# Patient Record
Sex: MALE | Race: WHITE | Employment: OTHER | URBAN - METROPOLITAN AREA
[De-identification: names, ages, dates, MRNs, and addresses within clinical notes are randomized per-mention and may not be internally consistent; named-entity substitution may affect disease eponyms.]

---

## 2017-07-25 ENCOUNTER — HOSPITAL ENCOUNTER (OUTPATIENT)
Dept: VASCULAR SURGERY | Age: 72
Discharge: HOME | End: 2017-07-25
Attending: FAMILY MEDICINE
Payer: MEDICARE

## 2017-07-25 DIAGNOSIS — I65.29: ICD-10-CM

## 2017-07-25 PROCEDURE — 93880 EXTRACRANIAL BILAT STUDY: CPT

## 2017-07-25 NOTE — PROCEDURES
[facility]  *** FINAL REPORT ***    Name: Barbi Kovacs  MRN: [id number]    Outpatient  : 13 Aug 1945  HIS Order #: [id number]  [id number] [facility] Visit #: [id number]  Date: 2017    TYPE OF TEST: Cerebrovascular Duplex    REASON FOR TEST  Known carotid stenosis    Right Carotid:-             Proximal               Mid                 Distal  cm/s  Systolic  Diastolic  Systolic  Diastolic  Systolic  Diastolic  CCA:     28.3      25.0                            56.0      19.0  Bulb:  ECA:     64.0  ICA:    108.0      34.0                            85.0      37.0  ICA/CCA:  1.9       1.8    ICA Stenosis:    Right Vertebral:-  Finding: Antegrade  Sys:       40.0  Rubia:    Right Subclavian:    Left Carotid:-            Proximal                Mid                 Distal  cm/s  Systolic  Diastolic  Systolic  Diastolic  Systolic  Diastolic  CCA:     11.1      13.0                            54.0      14.0  Bulb:  ECA:     87.0  ICA:    306.0     112.0                            40.0      20.0  ICA/CCA:  5.7       8.0    ICA Stenosis:    Left Vertebral:-  Finding: Antegrade  Sys:       37.0  Rubia:    Left Subclavian:    INTERPRETATION/FINDINGS  PROCEDURE:  Color duplex ultrasound imaging of extracranial  cerebrovascular arteries. FINDINGS:       Right:  Internal carotid velocity is within normal limits. There   is narrowing of the internal carotid flow channel on color Doppler  imaging and calcific-mix plaque on B-mode imaging, consistent with  less than 50 percent stenosis. The common and external carotid  arteries are patent and without evidence of hemodynamically  significant stenosis. Left:  Internal carotid velocity is elevated to a level  consistent with a 70 percent or greater stenosis; there is narrowing  of the flow channel on color Doppler imaging and calcific-mix plaque  is visualized on B-mode imaging.   The common and external carotid  arteries are patent and without evidence of hemodynamically  significant stenosis. IMPRESSION:  Consistent with less than 50% stenosis (higher end) of  the right internal carotid and greater than 70% stenosis of the left  internal carotid. Vertebrals are patent with antegrade flow. ADDITIONAL COMMENTS    I have personally reviewed the data relevant to the interpretation of  this  study.     TECHNOLOGIST: Mya Talley RVT  Signed: 07/25/2017 11:27 AM    PHYSICIAN: Yvette Hawkins MD  Signed: 07/26/2017 07:36 AM

## 2018-08-17 ENCOUNTER — HOSPITAL ENCOUNTER (OUTPATIENT)
Dept: VASCULAR SURGERY | Age: 73
Discharge: HOME | End: 2018-08-17
Attending: FAMILY MEDICINE
Payer: MEDICARE

## 2018-08-17 DIAGNOSIS — I65.29 CAROTID ARTERY STENOSIS WITHOUT CEREBRAL INFARCTION, UNSPECIFIED LATERALITY: ICD-10-CM

## 2018-08-17 PROCEDURE — 93880 EXTRACRANIAL BILAT STUDY: CPT

## 2018-08-17 NOTE — PROCEDURES
[facility]  *** FINAL REPORT ***    Name: Sandra Moss  MRN: [id number]    Outpatient  : 13 Aug 1945  HIS Order #: [id number]  [id number] [facility] Visit #: [id number]  Date: 17 Aug 2018    TYPE OF TEST: Cerebrovascular Duplex    REASON FOR TEST  Known carotid stenosis    Right Carotid:-             Proximal               Mid                 Distal  cm/s  Systolic  Diastolic  Systolic  Diastolic  Systolic  Diastolic  CCA:     01.1      22.0                            91.0      24.0  Bulb:  ECA:     97.0  ICA:    109.0      41.0       69.0      20.0       62.0      23.0  ICA/CCA:  1.2       1.7    ICA Stenosis:    Right Vertebral:-  Finding: Antegrade  Sys:       41.0  Rubia:    Right Subclavian:    Left Carotid:-            Proximal                Mid                 Distal  cm/s  Systolic  Diastolic  Systolic  Diastolic  Systolic  Diastolic  CCA:     44.1      16.0                            70.0      17.0  Bulb:  ECA:    118.0  ICA:    254.0      92.0      183.0      47.0       60.0      23.0  ICA/CCA:  3.6       5.4    ICA Stenosis:    Left Vertebral:-  Finding: Antegrade  Sys:       55.0  Rubia:    Left Subclavian:    INTERPRETATION/FINDINGS  PROCEDURE:  Color duplex ultrasound imaging of extracranial  cerebrovascular arteries. FINDINGS:       Right:  Internal carotid velocity is within normal limits. There   is narrowing of the internal carotid flow channel on color Doppler  imaging and calcific-mix plaque on B-mode imaging, consistent with  less than 50 percent stenosis. The common and external carotid  arteries are patent and without evidence of hemodynamically  significant stenosis. Left:  Internal carotid velocity is elevated to a level  consistent with a 70 percent or greater stenosis; there is narrowing  of the flow channel on color Doppler imaging and calcific plaque is  visualized on B-mode imaging.   The common and external carotid  arteries are patent and without evidence of hemodynamically  significant stenosis. IMPRESSION:  Consistent with less than 50% stenosis (higher end) of  the right internal carotid and greater than 70% stenosis (low end) of  the left internal carotid. Vertebrals are patent with antegrade flow. In comparison to the previous study done on 07/25/2017, there is no  evidence of a significant progression of stenosis on today's exam.    ADDITIONAL COMMENTS    I have personally reviewed the data relevant to the interpretation of  this  study.     TECHNOLOGIST: Elisha Rubio RVT  Signed: 08/17/2018 02:33 PM    PHYSICIAN: Isaura Suarez MD  Signed: 08/20/2018 06:55 AM

## 2019-10-05 ENCOUNTER — HOSPITAL ENCOUNTER (OUTPATIENT)
Dept: VASCULAR SURGERY | Age: 74
Discharge: HOME | End: 2019-10-05
Attending: FAMILY MEDICINE
Payer: MEDICARE

## 2019-10-05 DIAGNOSIS — I65.29 OCCLUSION AND STENOSIS OF UNSPECIFIED CAROTID ARTERY: ICD-10-CM

## 2019-10-05 LAB
LEFT CCA DIST DIAS: 12.9 CM/S
LEFT CCA DIST SYS: 44.2 CM/S
LEFT CCA PROX DIAS: 15.4 CM/S
LEFT CCA PROX SYS: 58.1 CM/S
LEFT ECA DIAS: 64.11 CM/S
LEFT ECA SYS: 175.6 CM/S
LEFT ICA DIST DIAS: 13.9 CM/S
LEFT ICA DIST SYS: 34.4 CM/S
LEFT ICA MID DIAS: 15.1 CM/S
LEFT ICA MID SYS: 47.7 CM/S
LEFT ICA PROX DIAS: 85.1 CM/S
LEFT ICA PROX SYS: 235.4 CM/S
LEFT ICA/CCA SYS: 5.33
LEFT VERTEBRAL DIAS: 13.24 CM/S
LEFT VERTEBRAL SYS: 43.8 CM/S
RIGHT CCA DIST DIAS: 18.3 CM/S
RIGHT CCA DIST SYS: 65.6 CM/S
RIGHT CCA PROX DIAS: 12.7 CM/S
RIGHT CCA PROX SYS: 58.1 CM/S
RIGHT ECA DIAS: 4.88 CM/S
RIGHT ECA SYS: 54.6 CM/S
RIGHT ICA DIST DIAS: 27 CM/S
RIGHT ICA DIST SYS: 76.5 CM/S
RIGHT ICA MID DIAS: 23.7 CM/S
RIGHT ICA MID SYS: 68.8 CM/S
RIGHT ICA PROX DIAS: 37 CM/S
RIGHT ICA PROX SYS: 113.4 CM/S
RIGHT ICA/CCA SYS: 1.7
RIGHT VERTEBRAL DIAS: 11.84 CM/S
RIGHT VERTEBRAL SYS: 37.6 CM/S

## 2019-10-05 PROCEDURE — 93880 EXTRACRANIAL BILAT STUDY: CPT

## 2020-12-02 ENCOUNTER — TRANSCRIBE ORDER (OUTPATIENT)
Dept: SCHEDULING | Age: 75
End: 2020-12-02

## 2020-12-02 DIAGNOSIS — I65.9: Primary | ICD-10-CM

## 2020-12-07 ENCOUNTER — HOSPITAL ENCOUNTER (OUTPATIENT)
Dept: VASCULAR SURGERY | Age: 75
Discharge: HOME | End: 2020-12-07
Attending: FAMILY MEDICINE
Payer: MEDICARE

## 2020-12-07 DIAGNOSIS — I65.9: ICD-10-CM

## 2020-12-07 PROCEDURE — 93880 EXTRACRANIAL BILAT STUDY: CPT

## 2020-12-08 LAB
LEFT CCA DIST DIAS: 18.8 CM/S
LEFT CCA DIST SYS: 59.8 CM/S
LEFT CCA PROX DIAS: 18 CM/S
LEFT CCA PROX SYS: 67.7 CM/S
LEFT ECA DIAS: 11.8 CM/S
LEFT ECA SYS: 81.8 CM/S
LEFT ICA DIST DIAS: 12.3 CM/S
LEFT ICA DIST SYS: 27.3 CM/S
LEFT ICA MID DIAS: 34 CM/S
LEFT ICA MID SYS: 110.7 CM/S
LEFT ICA PROX DIAS: 53.9 CM/S
LEFT ICA PROX SYS: 152.6 CM/S
LEFT ICA/CCA SYS: 2.5
LEFT VERTEBRAL DIAS: 14.7 CM/S
LEFT VERTEBRAL SYS: 38.9 CM/S
RIGHT ARM BP: 142 MMHG
RIGHT CCA DIST DIAS: 24.2 CM/S
RIGHT CCA DIST SYS: 79 CM/S
RIGHT CCA PROX DIAS: 22.9 CM/S
RIGHT CCA PROX SYS: 97.3 CM/S
RIGHT ECA DIAS: 10.6 CM/S
RIGHT ECA SYS: 41.3 CM/S
RIGHT ICA DIST DIAS: 27 CM/S
RIGHT ICA DIST SYS: 77.1 CM/S
RIGHT ICA MID DIAS: 17.6 CM/S
RIGHT ICA MID SYS: 63.8 CM/S
RIGHT ICA PROX DIAS: 54 CM/S
RIGHT ICA PROX SYS: 157.1 CM/S
RIGHT ICA/CCA SYS: 2
RIGHT VERTEBRAL DIAS: 13.6 CM/S
RIGHT VERTEBRAL SYS: 38.9 CM/S

## 2022-01-13 ENCOUNTER — TRANSCRIBE ORDER (OUTPATIENT)
Dept: SCHEDULING | Age: 77
End: 2022-01-13

## 2022-01-13 DIAGNOSIS — I65.29: Primary | ICD-10-CM

## 2022-01-25 ENCOUNTER — HOSPITAL ENCOUNTER (OUTPATIENT)
Dept: VASCULAR SURGERY | Age: 77
Discharge: HOME | End: 2022-01-25
Attending: FAMILY MEDICINE
Payer: MEDICARE

## 2022-01-25 DIAGNOSIS — I65.29: ICD-10-CM

## 2022-01-25 PROCEDURE — 93880 EXTRACRANIAL BILAT STUDY: CPT

## 2022-01-26 LAB
LEFT CCA DIST DIAS: 15.7 CM/S
LEFT CCA DIST SYS: 49.7 CM/S
LEFT CCA PROX DIAS: 15.7 CM/S
LEFT CCA PROX SYS: 58.2 CM/S
LEFT ECA DIAS: 13.9 CM/S
LEFT ECA SYS: 88.8 CM/S
LEFT ICA DIST DIAS: 17.8 CM/S
LEFT ICA DIST SYS: 36.2 CM/S
LEFT ICA MID DIAS: 18.5 CM/S
LEFT ICA MID SYS: 57.2 CM/S
LEFT ICA PROX DIAS: 59.3 CM/S
LEFT ICA PROX SYS: 205.3 CM/S
LEFT ICA/CCA SYS: 4.1
LEFT VERTEBRAL DIAS: 9.7 CM/S
LEFT VERTEBRAL SYS: 33.2 CM/S
RIGHT CCA DIST DIAS: 23.7 CM/S
RIGHT CCA DIST SYS: 75.4 CM/S
RIGHT CCA PROX DIAS: 13.8 CM/S
RIGHT CCA PROX SYS: 67.7 CM/S
RIGHT ECA DIAS: 15.7 CM/S
RIGHT ECA SYS: 72.3 CM/S
RIGHT ICA DIST DIAS: 26.7 CM/S
RIGHT ICA DIST SYS: 68.1 CM/S
RIGHT ICA MID DIAS: 35.1 CM/S
RIGHT ICA MID SYS: 87.9 CM/S
RIGHT ICA PROX DIAS: 50.4 CM/S
RIGHT ICA PROX SYS: 134.4 CM/S
RIGHT ICA/CCA SYS: 1.8
RIGHT VERTEBRAL DIAS: 13.9 CM/S
RIGHT VERTEBRAL SYS: 35.6 CM/S

## 2022-12-21 ENCOUNTER — TRANSCRIBE ORDER (OUTPATIENT)
Dept: SCHEDULING | Age: 77
End: 2022-12-21

## 2022-12-21 DIAGNOSIS — I65.29 CAROTID ARTERY STENOSIS: Primary | ICD-10-CM

## 2023-01-03 ENCOUNTER — HOSPITAL ENCOUNTER (OUTPATIENT)
Dept: VASCULAR SURGERY | Age: 78
Discharge: HOME | End: 2023-01-03
Attending: FAMILY MEDICINE
Payer: MEDICARE

## 2023-01-03 DIAGNOSIS — I65.29 CAROTID ARTERY STENOSIS: ICD-10-CM

## 2023-01-03 PROCEDURE — 93880 EXTRACRANIAL BILAT STUDY: CPT

## 2023-01-04 LAB
LEFT CCA DIST DIAS: 14.5 CM/S
LEFT CCA DIST SYS: 52.9 CM/S
LEFT CCA PROX DIAS: 15.4 CM/S
LEFT CCA PROX SYS: 64.2 CM/S
LEFT ECA DIAS: 24.8 CM/S
LEFT ECA SYS: 121.6 CM/S
LEFT ICA DIST DIAS: 19.4 CM/S
LEFT ICA DIST SYS: 43.5 CM/S
LEFT ICA MID DIAS: 19.2 CM/S
LEFT ICA MID SYS: 59.7 CM/S
LEFT ICA PROX DIAS: 63.8 CM/S
LEFT ICA PROX SYS: 193.3 CM/S
LEFT ICA/CCA SYS: 3.7 NO UNITS
LEFT VERTEBRAL DIAS: 14.9 CM/S
LEFT VERTEBRAL SYS: 40.7 CM/S
RIGHT CCA DIST DIAS: 24.8 CM/S
RIGHT CCA DIST SYS: 73.2 CM/S
RIGHT CCA PROX DIAS: 19 CM/S
RIGHT CCA PROX SYS: 80.3 CM/S
RIGHT ECA DIAS: 10.5 CM/S
RIGHT ECA SYS: 56.7 CM/S
RIGHT ICA DIST DIAS: 26.5 CM/S
RIGHT ICA DIST SYS: 72 CM/S
RIGHT ICA MID DIAS: 32.1 CM/S
RIGHT ICA MID SYS: 96.1 CM/S
RIGHT ICA PROX DIAS: 54.1 CM/S
RIGHT ICA PROX SYS: 134.4 CM/S
RIGHT ICA/CCA SYS: 1.8 NO UNITS
RIGHT VERTEBRAL DIAS: 10.9 CM/S
RIGHT VERTEBRAL SYS: 32 CM/S

## 2024-01-18 ENCOUNTER — HOSPITAL ENCOUNTER (OUTPATIENT)
Facility: HOSPITAL | Age: 79
Discharge: HOME | End: 2024-01-20
Payer: MEDICARE

## 2024-01-18 DIAGNOSIS — Z86.73 HX-TIA (TRANSIENT ISCHEMIC ATTACK): ICD-10-CM

## 2024-01-18 DIAGNOSIS — E78.5 HYPERLIPIDEMIA, UNSPECIFIED HYPERLIPIDEMIA TYPE: ICD-10-CM

## 2024-01-18 DIAGNOSIS — I65.29 OCCLUSION AND STENOSIS OF UNSPECIFIED CAROTID ARTERY: ICD-10-CM

## 2024-01-18 PROCEDURE — 93880 EXTRACRANIAL BILAT STUDY: CPT

## 2024-01-19 LAB
VAS LEFT CCA DIST EDV: 9.7 CM/S
VAS LEFT CCA DIST PSV: 47.5 CM/S
VAS LEFT CCA PROX EDV: 12.4 CM/S
VAS LEFT CCA PROX PSV: 52 CM/S
VAS LEFT ECA EDV: 17 CM/S
VAS LEFT ECA PSV: 113.8 CM/S
VAS LEFT ICA DIST EDV: 15.2 CM/S
VAS LEFT ICA DIST PSV: 30.6 CM/S
VAS LEFT ICA MID EDV: 20.4 CM/S
VAS LEFT ICA MID PSV: 67.7 CM/S
VAS LEFT ICA PROX EDV: 73.1 CM/S
VAS LEFT ICA PROX PSV: 238 CM/S
VAS LEFT ICA/CCA PSV: 5 NO UNITS
VAS LEFT VERTEBRAL EDV: 12.7 CM/S
VAS LEFT VERTEBRAL PSV: 39.8 CM/S
VAS RIGHT CCA DIST EDV: 21.1 CM/S
VAS RIGHT CCA DIST PSV: 71.1 CM/S
VAS RIGHT CCA PROX EDV: 9.9 CM/S
VAS RIGHT CCA PROX PSV: 66.9 CM/S
VAS RIGHT ECA EDV: 9.7 CM/S
VAS RIGHT ECA PSV: 71.1 CM/S
VAS RIGHT ICA DIST EDV: 27.4 CM/S
VAS RIGHT ICA DIST PSV: 69.2 CM/S
VAS RIGHT ICA MID EDV: 26.2 CM/S
VAS RIGHT ICA MID PSV: 81.4 CM/S
VAS RIGHT ICA PROX EDV: 40.7 CM/S
VAS RIGHT ICA PROX PSV: 133.9 CM/S
VAS RIGHT ICA/CCA PSV: 1.9 NO UNITS
VAS RIGHT VERTEBRAL EDV: 10.9 CM/S
VAS RIGHT VERTEBRAL PSV: 40.8 CM/S

## 2025-02-18 ENCOUNTER — HOSPITAL ENCOUNTER (EMERGENCY)
Facility: HOSPITAL | Age: 80
Discharge: HOME | End: 2025-02-18
Attending: EMERGENCY MEDICINE
Payer: MEDICARE

## 2025-02-18 ENCOUNTER — APPOINTMENT (OUTPATIENT)
Facility: HOSPITAL | Age: 80
End: 2025-02-18
Payer: MEDICARE

## 2025-02-18 VITALS
RESPIRATION RATE: 18 BRPM | DIASTOLIC BLOOD PRESSURE: 89 MMHG | WEIGHT: 191.8 LBS | HEIGHT: 70 IN | SYSTOLIC BLOOD PRESSURE: 123 MMHG | OXYGEN SATURATION: 97 % | BODY MASS INDEX: 27.46 KG/M2 | TEMPERATURE: 98.5 F | HEART RATE: 78 BPM

## 2025-02-18 DIAGNOSIS — R10.12 LEFT UPPER QUADRANT ABDOMINAL PAIN: Primary | ICD-10-CM

## 2025-02-18 DIAGNOSIS — N20.0 KIDNEY STONE: ICD-10-CM

## 2025-02-18 LAB
ALBUMIN SERPL-MCNC: 4 G/DL (ref 3.5–5)
ALBUMIN/GLOB SERPL: 1.2 (ref 1.1–2.2)
ALP SERPL-CCNC: 108 U/L (ref 45–117)
ALT SERPL-CCNC: 19 U/L (ref 12–78)
ANION GAP SERPL CALC-SCNC: 8 MMOL/L (ref 2–12)
APPEARANCE UR: ABNORMAL
AST SERPL-CCNC: 18 U/L (ref 15–37)
BACTERIA URNS QL MICRO: ABNORMAL /HPF
BASOPHILS # BLD: 0.02 K/UL (ref 0–0.1)
BASOPHILS NFR BLD: 0.2 % (ref 0–1)
BILIRUB SERPL-MCNC: 1.2 MG/DL (ref 0.2–1)
BILIRUB UR QL: NEGATIVE
BUN SERPL-MCNC: 19 MG/DL (ref 6–20)
BUN/CREAT SERPL: 15 (ref 12–20)
CALCIUM SERPL-MCNC: 9.1 MG/DL (ref 8.5–10.1)
CHLORIDE SERPL-SCNC: 104 MMOL/L (ref 97–108)
CO2 SERPL-SCNC: 25 MMOL/L (ref 21–32)
COLOR UR: ABNORMAL
COMMENT:: NORMAL
CREAT SERPL-MCNC: 1.25 MG/DL (ref 0.7–1.3)
DIFFERENTIAL METHOD BLD: ABNORMAL
EOSINOPHIL # BLD: 0.04 K/UL (ref 0–0.4)
EOSINOPHIL NFR BLD: 0.3 % (ref 0–7)
EPITH CASTS URNS QL MICRO: ABNORMAL /LPF
ERYTHROCYTE [DISTWIDTH] IN BLOOD BY AUTOMATED COUNT: 12.8 % (ref 11.5–14.5)
GLOBULIN SER CALC-MCNC: 3.4 G/DL (ref 2–4)
GLUCOSE SERPL-MCNC: 105 MG/DL (ref 65–100)
GLUCOSE UR STRIP.AUTO-MCNC: NEGATIVE MG/DL
HCT VFR BLD AUTO: 44.1 % (ref 36.6–50.3)
HGB BLD-MCNC: 15.3 G/DL (ref 12.1–17)
HGB UR QL STRIP: ABNORMAL
IMM GRANULOCYTES # BLD AUTO: 0.04 K/UL (ref 0–0.04)
IMM GRANULOCYTES NFR BLD AUTO: 0.3 % (ref 0–0.5)
KETONES UR QL STRIP.AUTO: 15 MG/DL
LEUKOCYTE ESTERASE UR QL STRIP.AUTO: ABNORMAL
LIPASE SERPL-CCNC: 33 U/L (ref 13–75)
LYMPHOCYTES # BLD: 0.59 K/UL (ref 0.8–3.5)
LYMPHOCYTES NFR BLD: 4.9 % (ref 12–49)
MCH RBC QN AUTO: 31.3 PG (ref 26–34)
MCHC RBC AUTO-ENTMCNC: 34.7 G/DL (ref 30–36.5)
MCV RBC AUTO: 90.2 FL (ref 80–99)
MONOCYTES # BLD: 0.83 K/UL (ref 0–1)
MONOCYTES NFR BLD: 6.9 % (ref 5–13)
MUCOUS THREADS URNS QL MICRO: ABNORMAL /LPF
NEUTS SEG # BLD: 10.58 K/UL (ref 1.8–8)
NEUTS SEG NFR BLD: 87.4 % (ref 32–75)
NITRITE UR QL STRIP.AUTO: NEGATIVE
NRBC # BLD: 0 K/UL (ref 0–0.01)
NRBC BLD-RTO: 0 PER 100 WBC
PH UR STRIP: 5.5 (ref 5–8)
PLATELET # BLD AUTO: 150 K/UL (ref 150–400)
PMV BLD AUTO: 10.9 FL (ref 8.9–12.9)
POTASSIUM SERPL-SCNC: 3.9 MMOL/L (ref 3.5–5.1)
PROT SERPL-MCNC: 7.4 G/DL (ref 6.4–8.2)
PROT UR STRIP-MCNC: 100 MG/DL
RBC # BLD AUTO: 4.89 M/UL (ref 4.1–5.7)
RBC #/AREA URNS HPF: ABNORMAL /HPF (ref 0–5)
RBC MORPH BLD: ABNORMAL
SODIUM SERPL-SCNC: 137 MMOL/L (ref 136–145)
SP GR UR REFRACTOMETRY: 1.02 (ref 1–1.03)
SPECIMEN HOLD: NORMAL
UROBILINOGEN UR QL STRIP.AUTO: 1 EU/DL (ref 0.2–1)
WBC # BLD AUTO: 12.1 K/UL (ref 4.1–11.1)
WBC URNS QL MICRO: ABNORMAL /HPF (ref 0–4)

## 2025-02-18 PROCEDURE — 80053 COMPREHEN METABOLIC PANEL: CPT

## 2025-02-18 PROCEDURE — 6360000004 HC RX CONTRAST MEDICATION: Performed by: EMERGENCY MEDICINE

## 2025-02-18 PROCEDURE — 2580000003 HC RX 258: Performed by: EMERGENCY MEDICINE

## 2025-02-18 PROCEDURE — 81001 URINALYSIS AUTO W/SCOPE: CPT

## 2025-02-18 PROCEDURE — 36415 COLL VENOUS BLD VENIPUNCTURE: CPT

## 2025-02-18 PROCEDURE — 85025 COMPLETE CBC W/AUTO DIFF WBC: CPT

## 2025-02-18 PROCEDURE — 96375 TX/PRO/DX INJ NEW DRUG ADDON: CPT

## 2025-02-18 PROCEDURE — 83690 ASSAY OF LIPASE: CPT

## 2025-02-18 PROCEDURE — 6360000002 HC RX W HCPCS: Performed by: EMERGENCY MEDICINE

## 2025-02-18 PROCEDURE — 74177 CT ABD & PELVIS W/CONTRAST: CPT

## 2025-02-18 PROCEDURE — 96374 THER/PROPH/DIAG INJ IV PUSH: CPT

## 2025-02-18 PROCEDURE — 99285 EMERGENCY DEPT VISIT HI MDM: CPT

## 2025-02-18 PROCEDURE — 96361 HYDRATE IV INFUSION ADD-ON: CPT

## 2025-02-18 RX ORDER — KETOROLAC TROMETHAMINE 30 MG/ML
15 INJECTION, SOLUTION INTRAMUSCULAR; INTRAVENOUS ONCE
Status: COMPLETED | OUTPATIENT
Start: 2025-02-18 | End: 2025-02-18

## 2025-02-18 RX ORDER — KETOROLAC TROMETHAMINE 10 MG/1
10 TABLET, FILM COATED ORAL EVERY 6 HOURS PRN
Qty: 20 TABLET | Refills: 0 | Status: SHIPPED | OUTPATIENT
Start: 2025-02-18

## 2025-02-18 RX ORDER — ONDANSETRON 2 MG/ML
4 INJECTION INTRAMUSCULAR; INTRAVENOUS
Status: COMPLETED | OUTPATIENT
Start: 2025-02-18 | End: 2025-02-18

## 2025-02-18 RX ORDER — ONDANSETRON 4 MG/1
4 TABLET, ORALLY DISINTEGRATING ORAL 3 TIMES DAILY PRN
Qty: 21 TABLET | Refills: 0 | Status: SHIPPED | OUTPATIENT
Start: 2025-02-18

## 2025-02-18 RX ORDER — 0.9 % SODIUM CHLORIDE 0.9 %
1000 INTRAVENOUS SOLUTION INTRAVENOUS ONCE
Status: COMPLETED | OUTPATIENT
Start: 2025-02-18 | End: 2025-02-18

## 2025-02-18 RX ORDER — IOPAMIDOL 755 MG/ML
100 INJECTION, SOLUTION INTRAVASCULAR
Status: COMPLETED | OUTPATIENT
Start: 2025-02-18 | End: 2025-02-18

## 2025-02-18 RX ADMIN — KETOROLAC TROMETHAMINE 15 MG: 30 INJECTION, SOLUTION INTRAMUSCULAR; INTRAVENOUS at 20:15

## 2025-02-18 RX ADMIN — ONDANSETRON 4 MG: 2 INJECTION, SOLUTION INTRAMUSCULAR; INTRAVENOUS at 20:03

## 2025-02-18 RX ADMIN — SODIUM CHLORIDE 1000 ML: 9 INJECTION, SOLUTION INTRAVENOUS at 20:03

## 2025-02-18 RX ADMIN — IOPAMIDOL 100 ML: 755 INJECTION, SOLUTION INTRAVENOUS at 19:32

## 2025-02-18 ASSESSMENT — PAIN - FUNCTIONAL ASSESSMENT
PAIN_FUNCTIONAL_ASSESSMENT: NONE - DENIES PAIN
PAIN_FUNCTIONAL_ASSESSMENT: ACTIVITIES ARE NOT PREVENTED
PAIN_FUNCTIONAL_ASSESSMENT: 0-10

## 2025-02-18 ASSESSMENT — ENCOUNTER SYMPTOMS
ABDOMINAL PAIN: 1
RHINORRHEA: 0
EYE PAIN: 0
COUGH: 0
SHORTNESS OF BREATH: 0
SORE THROAT: 0
BACK PAIN: 0
VOMITING: 0
DIARRHEA: 0
COLOR CHANGE: 0
NAUSEA: 0

## 2025-02-18 ASSESSMENT — PAIN DESCRIPTION - LOCATION: LOCATION: ABDOMEN

## 2025-02-18 ASSESSMENT — PAIN SCALES - GENERAL
PAINLEVEL_OUTOF10: 5
PAINLEVEL_OUTOF10: 0

## 2025-02-18 ASSESSMENT — PAIN DESCRIPTION - ORIENTATION: ORIENTATION: LEFT

## 2025-02-18 ASSESSMENT — PAIN DESCRIPTION - DESCRIPTORS: DESCRIPTORS: ACHING

## 2025-02-18 NOTE — ED TRIAGE NOTES
Patient arrives to ED reports abdominal pain and vomiting for 3-4 days. Patient concerned about his hernia

## 2025-02-19 NOTE — ED PROVIDER NOTES
[facility] EMERGENCY DEPARTMENT  EMERGENCY DEPARTMENT ENCOUNTER      Pt Name: William Devries  MRN: [id number]  Birthdate 1945  Date of evaluation: 2/18/2025  Provider: Albert Dugan MD    CHIEF COMPLAINT       Chief Complaint   Patient presents with    Abdominal Pain    Vomiting         HISTORY OF PRESENT ILLNESS   (Location/Symptom, Timing/Onset, Context/Setting, Quality, Duration, Modifying Factors, Severity)  Note limiting factors.   79-year-old male presents today with left flank pain and lower quadrant.  Has history of kidney stones.  Also history of hernia on that side.  States that hernia seems to be getting smaller not bigger and he has no pain on palpation of the hernia site.    The history is provided by the patient.   Abdominal Pain  Pain location:  L flank  Pain quality: aching, cramping and dull    Pain radiates to:  Does not radiate  Pain severity:  Mild  Onset quality:  Gradual  Timing:  Constant  Progression:  Resolved  Chronicity:  New  Context: not alcohol use, not awakening from sleep, not diet changes, not eating, not medication withdrawal, not previous surgeries, not recent illness and not recent sexual activity    Associated symptoms: no chest pain, no cough, no diarrhea, no dysuria, no fatigue, no fever, no nausea, no shortness of breath, no sore throat and no vomiting          Review of External Medical Records:     Nursing Notes were reviewed.    REVIEW OF SYSTEMS    (2-9 systems for level 4, 10 or more for level 5)     Review of Systems   Constitutional:  Negative for fatigue and fever.   HENT:  Negative for ear pain, rhinorrhea and sore throat.    Eyes:  Negative for pain and visual disturbance.   Respiratory:  Negative for cough and shortness of breath.    Cardiovascular:  Negative for chest pain.   Gastrointestinal:  Positive for abdominal pain. Negative for diarrhea, nausea and vomiting.   Genitourinary:  Negative for dysuria.   Musculoskeletal:  Negative for arthralgias,

## 2025-02-19 NOTE — ED NOTES
Pt is alert and oriented and in NADN; pt denies any pain, n/v at present and states he felt better once he got here.